# Patient Record
Sex: FEMALE | Race: WHITE | NOT HISPANIC OR LATINO | Employment: FULL TIME | URBAN - METROPOLITAN AREA
[De-identification: names, ages, dates, MRNs, and addresses within clinical notes are randomized per-mention and may not be internally consistent; named-entity substitution may affect disease eponyms.]

---

## 2023-08-17 ENCOUNTER — OFFICE VISIT (OUTPATIENT)
Dept: PLASTIC SURGERY | Facility: CLINIC | Age: 53
End: 2023-08-17
Payer: COMMERCIAL

## 2023-08-17 VITALS
WEIGHT: 114 LBS | HEIGHT: 63 IN | SYSTOLIC BLOOD PRESSURE: 112 MMHG | DIASTOLIC BLOOD PRESSURE: 62 MMHG | BODY MASS INDEX: 20.2 KG/M2

## 2023-08-17 DIAGNOSIS — Z90.13 STATUS POST BILATERAL MASTECTOMY: ICD-10-CM

## 2023-08-17 DIAGNOSIS — Z85.3 PERSONAL HISTORY OF BREAST CANCER: ICD-10-CM

## 2023-08-17 DIAGNOSIS — T85.848A PAIN FROM BREAST IMPLANT, INITIAL ENCOUNTER: Primary | ICD-10-CM

## 2023-08-17 PROCEDURE — 99204 OFFICE O/P NEW MOD 45 MIN: CPT | Performed by: PHYSICIAN ASSISTANT

## 2023-08-17 NOTE — PROGRESS NOTES
Assessment/Plan:    Patient is a 39-year-old female with a history of left breast cancer, status post bilateral mastectomy and reconstruction with bilateral breast implants in 2011 who presents as a new patient today due to breast pain and desire for implant removal.  Please see HPI. Patient's history is summarized as follows: Patient was found to have left breast cancer with "micrometastasis" to a sentinel lymph node in 2011. Patient elected for bilateral mastectomy with tissue expander placement. She did not require chemo or radiation. In 2011, patient had 350 cc Allergan implants placed bilaterally (see card in media). Approximately 5 years ago, in 2018, patient began experiencing bilateral, lateral breast pain, as well as intermittent, episodic chronic fatigue, insomnia, tingling in hands and feet, memory difficulty and joint pain. She has been worked up extensively for Lyme and autoimmune disorders, all work-up has thus far been negative. She did receive an MRI of her bilateral breast in 2018, which was negative for capsular contracture or implant rupture. Patient has been scheduled for a repeat MRI next week. She will then meet with Dr. Noble Infante to discuss surgical options. She is not interested in implant exchange but rather implant removal.    No problem-specific Assessment & Plan notes found for this encounter. Diagnoses and all orders for this visit:    Pain from breast implant, initial encounter    Status post bilateral mastectomy    Personal history of breast cancer          Subjective:      Patient ID: Robby Agrawal is a 46 y.o. female. HPI     Patient reports approximately 5 years of intermittent, cyclical symptoms as described above. She is very eager for implant removal.    The following portions of the patient's history were reviewed and updated as appropriate:   She  has no past medical history on file.   She   Patient Active Problem List    Diagnosis Date Noted   • Pain from breast implant 08/17/2023   • Status post bilateral mastectomy 08/17/2023   • Personal history of breast cancer 08/17/2023     She  has no past surgical history on file. Her family history is not on file. She  has no history on file for tobacco use, alcohol use, and drug use. No current outpatient medications on file. No current facility-administered medications for this visit. No current outpatient medications on file prior to visit. No current facility-administered medications on file prior to visit. She has no allergies on file. .    Review of Systems    12 point review of systems was completed and is negative except as per HPI    Objective:      /62          Physical Exam  Vitals and nursing note reviewed. Constitutional:       General: She is not in acute distress. Appearance: Normal appearance. She is normal weight. She is not ill-appearing. HENT:      Head: Normocephalic and atraumatic. Nose: Nose normal.      Mouth/Throat:      Mouth: Mucous membranes are moist.   Eyes:      Extraocular Movements: Extraocular movements intact. Conjunctiva/sclera: Conjunctivae normal.      Pupils: Pupils are equal, round, and reactive to light. Neck:      Vascular: No carotid bruit. Cardiovascular:      Rate and Rhythm: Normal rate and regular rhythm. Pulses: Normal pulses. Heart sounds: Normal heart sounds. Pulmonary:      Effort: Pulmonary effort is normal. No respiratory distress. Breath sounds: Normal breath sounds. Abdominal:      General: Abdomen is flat. Palpations: Abdomen is soft. Musculoskeletal:         General: No swelling, tenderness, deformity or signs of injury. Normal range of motion. Cervical back: Normal range of motion and neck supple. No rigidity or tenderness. Right lower leg: No edema. Left lower leg: No edema.       Comments: Bilateral reconstructed breast.  There is mild asymmetry with the left breast being smaller. There is mild rippling noted at the bilateral upper poles   Lymphadenopathy:      Cervical: No cervical adenopathy. Skin:     General: Skin is warm and dry. Neurological:      General: No focal deficit present. Mental Status: She is alert and oriented to person, place, and time. Cranial Nerves: No cranial nerve deficit. Sensory: No sensory deficit. Motor: No weakness.    Psychiatric:         Mood and Affect: Mood normal.         Behavior: Behavior normal.

## 2023-09-15 ENCOUNTER — OFFICE VISIT (OUTPATIENT)
Dept: PLASTIC SURGERY | Facility: HOSPITAL | Age: 53
End: 2023-09-15
Payer: COMMERCIAL

## 2023-09-15 VITALS
HEIGHT: 63 IN | DIASTOLIC BLOOD PRESSURE: 57 MMHG | BODY MASS INDEX: 19.67 KG/M2 | SYSTOLIC BLOOD PRESSURE: 99 MMHG | HEART RATE: 63 BPM | WEIGHT: 111 LBS

## 2023-09-15 DIAGNOSIS — Z42.1 AFTERCARE POSTMASTECTOMY FOR BREAST RECONSTRUCTION: ICD-10-CM

## 2023-09-15 DIAGNOSIS — Z90.13 STATUS POST BILATERAL MASTECTOMY: Primary | ICD-10-CM

## 2023-09-15 PROCEDURE — 99204 OFFICE O/P NEW MOD 45 MIN: CPT | Performed by: SURGERY

## 2023-09-15 NOTE — PROGRESS NOTES
Assessment/Plan: Please see HPI. Given the lack of certainty that her symptoms are attributed to the presence of breast implants, I encouraged her to first consider physical therapy prior to seeing the conclusion to undergo removal of bilateral breast implants. Both breasts are soft and supple, there is no evidence of capsular contracture, physical therapy is likely to improve her discomfort of the right breast, and we usually agreed to have López Garg return in 6 to 8 weeks for a follow-up visit to assess her progress. Diagnoses and all orders for this visit:    Status post bilateral mastectomy  -     Ambulatory Referral to Physical Therapy; Future          Subjective: History of bilateral breast reconstruction, discomfort right breast      Patient ID: Etienen Suarez is a 46 y.o. female. HPI briefly, López Garg is status post bilateral mastectomies from 2011, immediate expander reconstruction, eventual expander/implant exchange, per previous note she currently has 790 cc Allergan silicone gel implants in place. She is complaining of joint pain, fevers, fatigue, and right breast discomfort, and is considering undergoing removal of her bilateral breast implants in the hope that her symptoms would improve. She reports that she has been tested multiple times for Lyme disease, autoimmune disorders, etc. With negative results. We talked about the phenomenon of "breast implant illness", and that implant removal is not certain to improve her symptoms. She describes her pain/discomfort of right lateral breast is intermittent, occasionally worsened when pressure is applied to the site, such as when sleeping, etc.  Her report, MRI from August 2023 revealed intact implants, this will need to be reviewed.     The following portions of the patient's history were reviewed and updated as appropriate: allergies, current medications, past family history, past medical history, past social history, past surgical history and problem list.    Review of Systems   Constitutional: Negative for chills and fever. HENT: Negative for hearing loss. Eyes: Positive for visual disturbance. Negative for discharge. Respiratory: Positive for shortness of breath. Negative for chest tightness. Cardiovascular: Negative for chest pain and leg swelling. Gastrointestinal: Negative for blood in stool, constipation, diarrhea and nausea. Genitourinary: Negative for dysuria. Musculoskeletal: Negative for gait problem. Skin: Negative for rash. Allergic/Immunologic: Negative for immunocompromised state. Neurological: Positive for headaches. Negative for seizures. Hematological: Does not bruise/bleed easily. Psychiatric/Behavioral: Positive for dysphoric mood and sleep disturbance. The patient is nervous/anxious. Objective:      BP 99/57   Pulse 63   Ht 5' 3" (1.6 m)   Wt 50.3 kg (111 lb)   BMI 19.66 kg/m²          Physical Exam  Constitutional:       Appearance: Normal appearance. HENT:      Head: Normocephalic and atraumatic. Eyes:      Extraocular Movements: Extraocular movements intact. Pupils: Pupils are equal, round, and reactive to light. Cardiovascular:      Rate and Rhythm: Normal rate. Pulmonary:      Effort: Pulmonary effort is normal.   Abdominal:      Palpations: Abdomen is soft. Musculoskeletal:         General: Normal range of motion. Cervical back: Normal range of motion. Skin:     General: Skin is warm. Comments: Breast examination reveals bilateral reconstructed breasts with implants in place, both breasts are soft, supple and without evidence of capsular contracture, the inframammary fold on the right is somewhat lower than that on the left, no signs of unusual inflammation or infection, see photos in media   Neurological:      Mental Status: She is alert and oriented to person, place, and time.    Psychiatric:         Mood and Affect: Mood normal.